# Patient Record
Sex: MALE | Race: OTHER | HISPANIC OR LATINO | URBAN - METROPOLITAN AREA
[De-identification: names, ages, dates, MRNs, and addresses within clinical notes are randomized per-mention and may not be internally consistent; named-entity substitution may affect disease eponyms.]

---

## 2021-09-28 ENCOUNTER — EMERGENCY (EMERGENCY)
Facility: HOSPITAL | Age: 36
LOS: 1 days | Discharge: HOME | End: 2021-09-28
Attending: STUDENT IN AN ORGANIZED HEALTH CARE EDUCATION/TRAINING PROGRAM | Admitting: STUDENT IN AN ORGANIZED HEALTH CARE EDUCATION/TRAINING PROGRAM
Payer: MEDICAID

## 2021-09-28 VITALS
OXYGEN SATURATION: 98 % | RESPIRATION RATE: 18 BRPM | SYSTOLIC BLOOD PRESSURE: 148 MMHG | TEMPERATURE: 98 F | HEART RATE: 78 BPM | DIASTOLIC BLOOD PRESSURE: 75 MMHG

## 2021-09-28 PROCEDURE — 99283 EMERGENCY DEPT VISIT LOW MDM: CPT

## 2021-09-28 RX ORDER — IBUPROFEN 200 MG
400 TABLET ORAL ONCE
Refills: 0 | Status: COMPLETED | OUTPATIENT
Start: 2021-09-28 | End: 2021-09-28

## 2021-09-28 RX ORDER — ACETAMINOPHEN 500 MG
650 TABLET ORAL ONCE
Refills: 0 | Status: COMPLETED | OUTPATIENT
Start: 2021-09-28 | End: 2021-09-28

## 2021-09-28 RX ADMIN — Medication 650 MILLIGRAM(S): at 10:11

## 2021-09-28 RX ADMIN — Medication 400 MILLIGRAM(S): at 10:12

## 2021-09-28 NOTE — ED PROVIDER NOTE - PATIENT PORTAL LINK FT
You can access the FollowMyHealth Patient Portal offered by [facility] by registering at the following website: [email or web address]. By joining [employer]’s FollowMyHealth portal, you will also be able to view your health information using other applications (apps) compatible with our [email or web address]

## 2021-09-28 NOTE — ED PROVIDER NOTE - NSFOLLOWUPINSTRUCTIONS_ED_ALL_ED_FT
Please follow up with a rheumatologist within 1 week.   Take ibuprofen for pain (600 mg every 6 hours).     ARTHRITIS   WHAT YOU NEED TO KNOW:  Arthritis is pain or disease in one or more joints. There are many types of arthritis. Types such as rheumatoid arthritis cause inflammation in the joints. Other types wear away the cartilage between joints, such as osteoarthritis. This makes the bones of the joint rub together when you move the joint. An infection from bacteria, a virus, or a fungus can also cause arthritis. Your symptoms may be constant, or symptoms may come and go. Arthritis often gets worse over time and can cause permanent joint damage.  DISCHARGE INSTRUCTIONS:  Call your doctor or rheumatologist if:   •You have a fever and severe joint pain or swelling.  •You cannot move the affected joint.  •You have severe joint pain you cannot tolerate.  •You have a new or worsening rash.  •Your pain or swelling does not get better with treatment.  •You have questions or concerns about your condition or care.  Medicines:   •Acetaminophen decreases pain and fever. It is available without a doctor's order. Ask how much to take and how often to take it. Follow directions. Read the labels of all other medicines you are using to see if they also contain acetaminophen, or ask your doctor or pharmacist. Acetaminophen can cause liver damage if not taken correctly. Do not use more than 4 grams (4,000 milligrams) total of acetaminophen in one day.   •NSAIDs, such as ibuprofen, help decrease swelling, pain, and fever. This medicine is available with or without a doctor's order. NSAIDs can cause stomach bleeding or kidney problems in certain people. If you take blood thinner medicine, always ask your healthcare provider if NSAIDs are safe for you. Always read the medicine label and follow directions  •Steroids reduce swelling and pain.  •Prescription pain medicine may be given. Ask your healthcare provider how to take this medicine safely. Some prescription pain medicines contain acetaminophen. Do not take other medicines that contain acetaminophen without talking to your healthcare provider. Too much acetaminophen may cause liver damage. Prescription pain medicine may cause constipation. Ask your healthcare provider how to prevent or treat constipation.   •Take your medicine as directed. Contact your healthcare provider if you think your medicine is not helping or if you have side effects. Tell him of her if you are allergic to any medicine. Keep a list of the medicines, vitamins, and herbs you take. Include the amounts, and when and why you take them. Bring the list or the pill bottles to follow-up visits. Carry your medicine list with you in case of an emergency.  MANAGE YOUR SYMPTOMS:   •Rest your painful joint so it can heal. Your healthcare provider may recommend crutches or a walker if the affected joint is in a leg.  •Apply ice or heat to the joint. Both can help decrease swelling and pain. Ice may also help prevent tissue damage. Use an ice pack, or put crushed ice in a plastic bag. Cover it with a towel and place it on your joint for 15 to 20 minutes every hour or as directed. You can apply heat for 20 minutes every 2 hours. Heat treatment includes hot packs or heat lamps.  •Elevate your joint. Elevation helps reduce swelling and pain. Raise your joint above the level of your heart as often as you can. Prop your painful joint on pillows to keep it above your heart comfortably.  Elevate Leg  MANAGE ARTHRITIS  •Talk to your healthcare providers about your arthritis medicines. Some medicines may only be needed when you have arthritis pain. You may need to take other medicines every day to prevent arthritis from getting worse. Your healthcare providers will help you understand all your medicines and when to take them. It is important to take the medicines as directed, even if you start to feel better. You can continue to have joint damage and inflammation even if you do not feel it.  •Eat a variety of healthy foods. Healthy foods include fruits, vegetables, whole-grain breads, low-fat dairy products, beans, lean meats, and fish. Ask if you need to be on a special diet. A diet rich in calcium and vitamin D may decrease your risk of osteoporosis. Foods high in calcium include milk, cheese, broccoli, and tofu. Vitamin D may be found in meat, fish, fortified milk, cereal and bread. Ask if you need calcium or vitamin D supplements.   •Go to physical or occupational therapy as directed. A physical therapist can teach you exercises to improve flexibility and range of motion. You may also be shown non-weight-bearing exercises that are safe for your joints, such as swimming. Exercise can help keep your joints flexible and reduce pain. An occupational therapist can help you learn to do your daily activities when your joints are stiff or sore.  •Maintain a healthy weight. Extra weight puts increased pressure on your joints. Ask your healthcare provider what you should weigh. If you need to lose weight, he or she can help you create a weight loss program. Weight loss can help reduce pain and increase your ability to do your activities.  •Wear flat or low-heeled shoes. This will help decrease pain and reduce pressure on your ankle, knee, and hip joints.  •Do not smoke. Nicotine and other chemicals in cigarettes and cigars can damage your bones and joints. Ask your healthcare provider for information if you currently smoke and need help to quit. E-cigarettes or smokeless tobacco still contain nicotine. Talk to your healthcare provider before you use these products.   Support devices:   •Orthotic shoes or insoles help support your feet when you walk.  •Crutches, a cane, or a walker may help decrease your risk for falling. They also decrease stress on affected joints.  •Devices to prevent falls include raised toilet seats and bathtub bars to help you get up from sitting. Handrails can be placed in areas where you need balance and support.  •Devices to help with support and rest include splints to wear on your hands and a firm pillow while you sleep. Use a pillow that is firm enough to support your neck and head.  Follow up with your healthcare provider or rheumatologist as directed: Write down your questions so you remember to ask them during your visits.  Artritis    LO QUE NECESITA SABER:  La artritis es dolor o enfermedad en tiffany o más articulaciones. Existen muchos tipos de artritis. Los tipos zita la artritis reumatoide provocan inflamación en las articulaciones. Otros tipos desgastan el cartílago entre las articulaciones. [place] hace que los huesos de las articulaciones se rocen entre sí cuando usted mueve la articulación. Tiffany infección por bacterias, un virus o un hongo también puede causar artritis. Galina síntomas pueden ser constantes o pueden ser intermitentes. La artritis por lo general empeora con el paso del tiempo y puede provocar daños permanentes en la articulación.  INSTRUCCIONES SOBRE EL TRISTEN HOSPITALARIA:  Llame a diehl médico o reumatólogo si:  Usted tiene fiebre y un eren dolor o inflamación en la articulación.  Usted no puede  la articulación afectada.  Usted tiene dolor articular intenso que no puede tolerar.  Usted tiene un sarpullido nuevo o que empeora.  Diehl dolor o inflamación no mejoran con el tratamiento.  Usted tiene preguntas o inquietudes acerca de diehl condición o cuidado.  Medicamentos:  Acetaminofénalivia el dolor y baja la fiebre. Está disponible sin receta médica. Pregunte la cantidad y la frecuencia con que debe tomarlos. Siga las indicaciones. Karime las etiquetas de todos los demás medicamentos que esté usando para saber si también contienen acetaminofén, o pregunte a diehl médico o farmacéutico. El acetaminofén puede causar daño en el hígado cuando no se areli de forma correcta. No use más de 4 gramos (4000 miligramos) en total de acetaminofeno en un día.  Los REESE,zita el ibuprofeno, ayudan a disminuir la inflamación, el dolor y la fiebre. Alondra medicamento está disponible con o sin tiffany receta médica. Los REESE pueden causar sangrado estomacal o problemas renales en ciertas personas. Si usted areli un medicamento anticoagulante, siempre pregúntele a diehl médico si los REESE son seguros para usted. Siempre karime la etiqueta de alondra medicamento y siga las instrucciones.  Esteroidesreducen la inflamación y el dolor.  Puede administrarsepodrían administrarse. Pregunte al médico cómo debe rodney alondra medicamento de forma rajan. Algunos medicamentos recetados para el dolor contienen acetaminofén. No tome otros medicamentos que contengan acetaminofén sin consultarlo con diehl médico. Demasiado acetaminofeno puede causar daño al hígado. Los medicamentos recetados para el dolor podrían causar estreñimiento. Pregunte a diehl médico zita prevenir o tratar estreñimiento.  [place] galina medicamentos zita se le haya indicado.Consulte con diehl médico si usted pawan que diehl medicamento no le está ayudando o si presenta efectos secundarios. Infórmele si es alérgico a algún medicamento. Mantenga tiffany lista actualizada de los medicamentos, las vitaminas y los productos herbales que areli. Incluya los siguientes datos de los medicamentos: cantidad, frecuencia y motivo de administración. Traiga con usted la lista o los envases de las píldoras a galina citas de seguimiento. Lleve la lista de los medicamentos con usted en foster de tiffany emergencia.  El manejo de galina síntomas:  Descanse diehl articulación adolorida para que pueda recuperarse.Diehl médico podría recomendarle que use muletas o un caminador si la articulación afectada está en tiffany pierna.  Aplique hielo o calor a diehl articulación.Aplique hielo a diehl articulación. El hielo también puede contribuir a evitar el daño de los tejidos. Use tiffany compresa de hielo o ponga hielo triturado en tiffany bolsa de plástico. Cúbrala con tiffany toalla y póngasela en la articulación adolorida de 15 a 20 minutos cada hora o según las indicaciones. Usted puede aplicarse calor geoffrey 20 minutos cada 2 horas. Para el tratamiento con calor puede usar compresas calientes o tiffany lámpara de calor.  Eleve diehl articulación.[place] le ayudará a disminuir la inflamación y el dolor. Eleve diehl articulación por encima del nivel del corazón con la frecuencia que pueda. Apoye diehl articulación adolorida sobre almohadas para mantenerla cómodamente por encima del nivel del corazón.  Elevar la pierna  Controle la artritis:  Informe a galina médicos sobre los medicamentos que areli para la artritis.Es posible que deba rodney algunos medicamentos solamente si usted siente dolor vinculado con la artritis. Es posible que deba rodney otros medicamentos todos los días para evitar que la artritis empeore. Galina médicos le ayudarán a entender todos galina medicamentos y cuándo tomarlos. Es importante que tome los medicamentos zita se le indica, incluso si comienza a sentirse mejor. Usted puede continuar teniendo daño e inflamación articular, incluso si no lo siente.  Consuma alimentos saludables y variados.Los alimentos saludables incluyen frutas, verduras, pan integral, productos lácteos bajos en grasa, frijoles, amanda magras y pescado. Pregunte si necesita seguir tiffany dieta especial. Es posible que tiffany dieta tristen en calcio y vitamina D disminuya diehl riesgo de tener osteoporosis. Los alimentos altos en calcio incluyen leche, queso, brócoli y tofu. La vitamina D puede encontrarse en la carne, el pescado, la leche fortificada, los cereales y el pan. Consulte si debe rodney suplementos de calcio o de vitamina D.  Asista a terapia física u ocupacional zita se le indique.Un fisioterapeuta le puede enseñar ejercicios para mejorar la flexibilidad y el rango de movimiento. También le pueden mostrar ejercicios que no implican soporte de peso y que son adecuados para las articulaciones zita la natación. El ejercicio puede ayudarle con la flexibilidad de las articulaciones y a reducir el dolor. Un terapeuta ocupacional puede ayudarle para que aprenda a hacer galina actividades cotidianas cuando galina articulaciones estén rígidas o adoloridas.  Mantenga un peso saludable.El exceso de peso ejerce presión a galina articulaciones. Pregunte a diehl proveedor cuál debería de ser diehl peso. Si necesita perder peso, él puede ayudarle a crear un programa para bajar de peso. La pérdida de peso le puede ayudar a reducir el dolor y aumentar diehl habilidad para llevar a cabo galina actividades.  Use zapatos sin tacones o de tacones bajos.[place] le servirá para aliviar el dolor y a disminuir la presión que se ejerce en las articulaciones de diehl tobillo, rodilla y caderas.  No fume.La nicotina y otros químicos contenidos en los cigarrillos y cigarros pueden dañar los huesos y las articulaciones. Pida información a diehl médico si usted actualmente fuma y necesita ayuda para dejar de fumar. Los cigarrillos electrónicos o el tabaco sin humo igualmente contienen nicotina. Consulte con diehl médico antes de utilizar estos productos.  Dispositivos de apoyo:  Los zapatos o plantillas ortopédicossirven para proporcionar soporte a diehl pies cuando camina.  Unas muletas, un bastón o un caminadorpodrían ayudar a reducir el riesgo de tiffany caída. También disminuyen la presión sobre las articulaciones afectadas.  Los dispositivos para evitar las caídasincluyen los asientos elevados para el inodoro y las barras de apoyo de la ulisses del baño para ayudarlo a levantarse cuando está sentado. Se pueden colocar barandas en las áreas donde usted necesite equilibrio y apoyo.  Los dispositivos para ayudar con el apoyo y el descansoincluyen férulas para usar en las megan y tiffany almohada firme mientras duerme. Use tiffany almohada lo suficientemente firme para que le proporcione soporte al eveline y a la ghulam.  Programe tiffany elfego con diehl médico o reumatólogo zita se le indique:[place] galina preguntas para que se acuerde de hacerlas geoffrey galina visitas. Please follow up with a rheumatologist within 1 week.   Take ibuprofen for pain (600 mg every 6 hours). Tylenol 650 mg every 5    [facility]  Rheumatology  [address], [address]  [place] [address]  Phone: [phone]  Follow Up Time: 4-6 Days      ARTHRITIS   WHAT YOU NEED TO KNOW:  Arthritis is pain or disease in one or more joints. There are many types of arthritis. Types such as rheumatoid arthritis cause inflammation in the joints. Other types wear away the cartilage between joints, such as osteoarthritis. This makes the bones of the joint rub together when you move the joint. An infection from bacteria, a virus, or a fungus can also cause arthritis. Your symptoms may be constant, or symptoms may come and go. Arthritis often gets worse over time and can cause permanent joint damage.  DISCHARGE INSTRUCTIONS:  Call your doctor or rheumatologist if:   •You have a fever and severe joint pain or swelling.  •You cannot move the affected joint.  •You have severe joint pain you cannot tolerate.  •You have a new or worsening rash.  •Your pain or swelling does not get better with treatment.  •You have questions or concerns about your condition or care.  Medicines:   •Acetaminophen decreases pain and fever. It is available without a doctor's order. Ask how much to take and how often to take it. Follow directions. Read the labels of all other medicines you are using to see if they also contain acetaminophen, or ask your doctor or pharmacist. Acetaminophen can cause liver damage if not taken correctly. Do not use more than 4 grams (4,000 milligrams) total of acetaminophen in one day.   •NSAIDs, such as ibuprofen, help decrease swelling, pain, and fever. This medicine is available with or without a doctor's order. NSAIDs can cause stomach bleeding or kidney problems in certain people. If you take blood thinner medicine, always ask your healthcare provider if NSAIDs are safe for you. Always read the medicine label and follow directions  •Steroids reduce swelling and pain.  •Prescription pain medicine may be given. Ask your healthcare provider how to take this medicine safely. Some prescription pain medicines contain acetaminophen. Do not take other medicines that contain acetaminophen without talking to your healthcare provider. Too much acetaminophen may cause liver damage. Prescription pain medicine may cause constipation. Ask your healthcare provider how to prevent or treat constipation.   •Take your medicine as directed. Contact your healthcare provider if you think your medicine is not helping or if you have side effects. Tell him of her if you are allergic to any medicine. Keep a list of the medicines, vitamins, and herbs you take. Include the amounts, and when and why you take them. Bring the list or the pill bottles to follow-up visits. Carry your medicine list with you in case of an emergency.  MANAGE YOUR SYMPTOMS:   •Rest your painful joint so it can heal. Your healthcare provider may recommend crutches or a walker if the affected joint is in a leg.  •Apply ice or heat to the joint. Both can help decrease swelling and pain. Ice may also help prevent tissue damage. Use an ice pack, or put crushed ice in a plastic bag. Cover it with a towel and place it on your joint for 15 to 20 minutes every hour or as directed. You can apply heat for 20 minutes every 2 hours. Heat treatment includes hot packs or heat lamps.  •Elevate your joint. Elevation helps reduce swelling and pain. Raise your joint above the level of your heart as often as you can. Prop your painful joint on pillows to keep it above your heart comfortably.  Elevate Leg  MANAGE ARTHRITIS  •Talk to your healthcare providers about your arthritis medicines. Some medicines may only be needed when you have arthritis pain. You may need to take other medicines every day to prevent arthritis from getting worse. Your healthcare providers will help you understand all your medicines and when to take them. It is important to take the medicines as directed, even if you start to feel better. You can continue to have joint damage and inflammation even if you do not feel it.  •Eat a variety of healthy foods. Healthy foods include fruits, vegetables, whole-grain breads, low-fat dairy products, beans, lean meats, and fish. Ask if you need to be on a special diet. A diet rich in calcium and vitamin D may decrease your risk of osteoporosis. Foods high in calcium include milk, cheese, broccoli, and tofu. Vitamin D may be found in meat, fish, fortified milk, cereal and bread. Ask if you need calcium or vitamin D supplements.   •Go to physical or occupational therapy as directed. A physical therapist can teach you exercises to improve flexibility and range of motion. You may also be shown non-weight-bearing exercises that are safe for your joints, such as swimming. Exercise can help keep your joints flexible and reduce pain. An occupational therapist can help you learn to do your daily activities when your joints are stiff or sore.  •Maintain a healthy weight. Extra weight puts increased pressure on your joints. Ask your healthcare provider what you should weigh. If you need to lose weight, he or she can help you create a weight loss program. Weight loss can help reduce pain and increase your ability to do your activities.  •Wear flat or low-heeled shoes. This will help decrease pain and reduce pressure on your ankle, knee, and hip joints.  •Do not smoke. Nicotine and other chemicals in cigarettes and cigars can damage your bones and joints. Ask your healthcare provider for information if you currently smoke and need help to quit. E-cigarettes or smokeless tobacco still contain nicotine. Talk to your healthcare provider before you use these products.   Support devices:   •Orthotic shoes or insoles help support your feet when you walk.  •Crutches, a cane, or a walker may help decrease your risk for falling. They also decrease stress on affected joints.  •Devices to prevent falls include raised toilet seats and bathtub bars to help you get up from sitting. Handrails can be placed in areas where you need balance and support.  •Devices to help with support and rest include splints to wear on your hands and a firm pillow while you sleep. Use a pillow that is firm enough to support your neck and head.  Follow up with your healthcare provider or rheumatologist as directed: Write down your questions so you remember to ask them during your visits.  Artritis    LO QUE NECESITA SABER:  La artritis es dolor o enfermedad en tiffany o más articulaciones. Existen muchos tipos de artritis. Los tipos zita la artritis reumatoide provocan inflamación en las articulaciones. Otros tipos desgastan el cartílago entre las articulaciones. [place] hace que los huesos de las articulaciones se rocen entre sí cuando usted mueve la articulación. Tiffany infección por bacterias, un virus o un hongo también puede causar artritis. Galina síntomas pueden ser constantes o pueden ser intermitentes. La artritis por lo general empeora con el paso del tiempo y puede provocar daños permanentes en la articulación.  INSTRUCCIONES SOBRE EL TRISTEN HOSPITALARIA:  Llame a diehl médico o reumatólogo si:  Usted tiene fiebre y un eren dolor o inflamación en la articulación.  Usted no puede  la articulación afectada.  Usted tiene dolor articular intenso que no puede tolerar.  Usted tiene un sarpullido nuevo o que empeora.  Diehl dolor o inflamación no mejoran con el tratamiento.  Usted tiene preguntas o inquietudes acerca de diehl condición o cuidado.  Medicamentos:  Acetaminofénalivia el dolor y baja la fiebre. Está disponible sin receta médica. Pregunte la cantidad y la frecuencia con que debe tomarlos. Siga las indicaciones. Karime las etiquetas de todos los demás medicamentos que esté usando para saber si también contienen acetaminofén, o pregunte a diehl médico o farmacéutico. El acetaminofén puede causar daño en el hígado cuando no se areli de forma correcta. No use más de 4 gramos (4000 miligramos) en total de acetaminofeno en un día.  Los REESE,zita el ibuprofeno, ayudan a disminuir la inflamación, el dolor y la fiebre. Alondra medicamento está disponible con o sin tiffany receta médica. Los REESE pueden causar sangrado estomacal o problemas renales en ciertas personas. Si usted areli un medicamento anticoagulante, siempre pregúntele a diehl médico si los REESE son seguros para usted. Siempre karime la etiqueta de alondra medicamento y siga las instrucciones.  Esteroidesreducen la inflamación y el dolor.  Puede administrarsepodrían administrarse. Pregunte al médico cómo debe rodney alondra medicamento de forma rajan. Algunos medicamentos recetados para el dolor contienen acetaminofén. No tome otros medicamentos que contengan acetaminofén sin consultarlo con diehl médico. Demasiado acetaminofeno puede causar daño al hígado. Los medicamentos recetados para el dolor podrían causar estreñimiento. Pregunte a diehl médico zita prevenir o tratar estreñimiento.  [place] galina medicamentos zita se le haya indicado.Consulte con diehl médico si usted pawan que diehl medicamento no le está ayudando o si presenta efectos secundarios. Infórmele si es alérgico a algún medicamento. Mantenga tiffany lista actualizada de los medicamentos, las vitaminas y los productos herbales que areli. Incluya los siguientes datos de los medicamentos: cantidad, frecuencia y motivo de administración. Traiga con usted la lista o los envases de las píldoras a galina citas de seguimiento. Lleve la lista de los medicamentos con usted en foster de tiffany emergencia.  El manejo de galina síntomas:  Descanse diehl articulación adolorida para que pueda recuperarse.Diehl médico podría recomendarle que use muletas o un caminador si la articulación afectada está en tiffany pierna.  Aplique hielo o calor a diehl articulación.Aplique hielo a diehl articulación. El hielo también puede contribuir a evitar el daño de los tejidos. Use tiffany compresa de hielo o ponga hielo triturado en tiffany bolsa de plástico. Cúbrala con tiffany toalla y póngasela en la articulación adolorida de 15 a 20 minutos cada hora o según las indicaciones. Usted puede aplicarse calor geoffrey 20 minutos cada 2 horas. Para el tratamiento con calor puede usar compresas calientes o tiffany lámpara de calor.  Eleve diehl articulación.[place] le ayudará a disminuir la inflamación y el dolor. Eleve diehl articulación por encima del nivel del corazón con la frecuencia que pueda. Apoye diehl articulación adolorida sobre almohadas para mantenerla cómodamente por encima del nivel del corazón.  Elevar la pierna  Controle la artritis:  Informe a galina médicos sobre los medicamentos que areli para la artritis.Es posible que deba rodney algunos medicamentos solamente si usted siente dolor vinculado con la artritis. Es posible que deba rodney otros medicamentos todos los días para evitar que la artritis empeore. Galina médicos le ayudarán a entender todos galina medicamentos y cuándo tomarlos. Es importante que tome los medicamentos zita se le indica, incluso si comienza a sentirse mejor. Usted puede continuar teniendo daño e inflamación articular, incluso si no lo siente.  Consuma alimentos saludables y variados.Los alimentos saludables incluyen frutas, verduras, pan integral, productos lácteos bajos en grasa, frijoles, amanda magras y pescado. Pregunte si necesita seguir tiffany dieta especial. Es posible que tiffany dieta tristen en calcio y vitamina D disminuya diehl riesgo de tener osteoporosis. Los alimentos altos en calcio incluyen leche, queso, brócoli y tofu. La vitamina D puede encontrarse en la carne, el pescado, la leche fortificada, los cereales y el pan. Consulte si debe rodney suplementos de calcio o de vitamina D.  Asista a terapia física u ocupacional zita se le indique.Un fisioterapeuta le puede enseñar ejercicios para mejorar la flexibilidad y el rango de movimiento. También le pueden mostrar ejercicios que no implican soporte de peso y que son adecuados para las articulaciones zita la natación. El ejercicio puede ayudarle con la flexibilidad de las articulaciones y a reducir el dolor. Un terapeuta ocupacional puede ayudarle para que aprenda a hacer galina actividades cotidianas cuando galina articulaciones estén rígidas o adoloridas.  Mantenga un peso saludable.El exceso de peso ejerce presión a galina articulaciones. Pregunte a diehl proveedor cuál debería de ser diehl peso. Si necesita perder peso, él puede ayudarle a crear un programa para bajar de peso. La pérdida de peso le puede ayudar a reducir el dolor y aumentar diehl habilidad para llevar a cabo galina actividades.  Use zapatos sin tacones o de tacones bajos.[place] le servirá para aliviar el dolor y a disminuir la presión que se ejerce en las articulaciones de diehl tobillo, rodilla y caderas.  No fume.La nicotina y otros químicos contenidos en los cigarrillos y cigarros pueden dañar los huesos y las articulaciones. Pida información a diehl médico si usted actualmente fuma y necesita ayuda para dejar de fumar. Los cigarrillos electrónicos o el tabaco sin humo igualmente contienen nicotina. Consulte con diehl médico antes de utilizar estos productos.  Dispositivos de apoyo:  Los zapatos o plantillas ortopédicossirven para proporcionar soporte a diehl pies cuando camina.  Unas muletas, un bastón o un caminadorpodrían ayudar a reducir el riesgo de tiffany caída. También disminuyen la presión sobre las articulaciones afectadas.  Los dispositivos para evitar las caídasincluyen los asientos elevados para el inodoro y las barras de apoyo de la ulisses del baño para ayudarlo a levantarse cuando está sentado. Se pueden colocar barandas en las áreas donde usted necesite equilibrio y apoyo.  Los dispositivos para ayudar con el apoyo y el descansoincluyen férulas para usar en las megan y tiffany almohada firme mientras duerme. Use tiffany almohada lo suficientemente firme para que le proporcione soporte al eveline y a la ghulam.  Programe tiffany elfego con diehl médico o reumatólogo zita se le indique:Anote galina preguntas para que se acuerde de hacerlas geoffrey galina visitas.

## 2021-09-28 NOTE — ED ADULT TRIAGE NOTE - CHIEF COMPLAINT QUOTE
p/t c/o of pain to joints and bilateral hands for few weeks on and off denies any trauma, good ROM noted

## 2021-09-28 NOTE — ED PROVIDER NOTE - NSFOLLOWUPCLINICS_GEN_ALL_ED_FT
[facility] Rheumatology  Rheumatology  [address] [address]  Phone: [phone]  Fax:   Follow Up Time: 4-6 Days

## 2021-09-28 NOTE — ED PROVIDER NOTE - OBJECTIVE STATEMENT
36 year old male with PMH gout presents with joint pains. Reports 4 weeks of pain in PIPs of B/L 2nd-5th digits and right 1st toe. Hand pain began 3 weeks ago and has been consistent. Patient's toe pain began 3 days ago. Patient reports joint pains are worse upon waking and improve throughout the day. He has taken ibuprofen for symptoms with some relief. Denies fever, chills, blurry vision, chest pain, shortness of breath, abdominal pain.

## 2021-09-28 NOTE — ED PROVIDER NOTE - NS ED ROS FT
ROS:  -Constitutional: Denies fever  -Head: Denies headache  -Eyes: Denies blurry vision  -Cardiovascular: Denies chest pain  -Pulmonary: Denies shortness of breath  -Gastrointestinal: Denies nausea or diarrhea  -Genitourinary: Denies dysuria  -Skin: Denies new rashes  -Neuro: Denies numbness or tingling or weakness

## 2021-09-28 NOTE — ED PROVIDER NOTE - ATTENDING CONTRIBUTION TO CARE
I (José Antonio) agree with above, I performed a history and physical. Counseled charlette medical staff, physician assistant, and/or medical student on medical decision making as documented. Medical decisions and treatment interventions were made in real time during the patient encounter. Additionally and/or with the following exceptions: the patient presented with chronic multi joint pain which is worse when waking up and better later in the night, responds to nsaids. does have a history of gout in right toe. ||| vital signs normal and stable during my period of care ||| minimal joint effusion right big toe, no erythema, no pain on joint movement ||| will give rheum follow up for probable autoimmune arthritis  counseled on nsaid/apap use, return precautions reviewed.

## 2021-09-28 NOTE — ED PROVIDER NOTE - PHYSICAL EXAMINATION
PHYSICAL EXAM:  CONSTITUTIONAL: Well appearing, awake, alert, oriented to person, place, time/situation and in no apparent distress.  HEAD: Atraumatic  EYES: Clear bilaterally, pupils equal, round and reactive to light.  CARDIAC: Normal rate, regular rhythm. +S1/S2. No murmurs, rubs or gallops.  RESPIRATORY: Breathing unlabored.  NEUROLOGICAL: Alert and oriented, no focal deficits, no motor or sensory deficits.  MSK:  - Tenderness over PIPs of 2nd-4th digits B/L, minimal effusion.  - Tenderness and minimal effusion  SKIN: Skin warm and dry. No evidence of rashes or lesions. PHYSICAL EXAM:  CONSTITUTIONAL: Well appearing, awake, alert, oriented to person, place, time/situation and in no apparent distress.  HEAD: Atraumatic  EYES: Clear bilaterally, pupils equal, round and reactive to light.  CARDIAC: Normal rate, regular rhythm. +S1/S2. No murmurs, rubs or gallops.  RESPIRATORY: Breathing unlabored.  NEUROLOGICAL: Alert and oriented, no focal deficits, no motor or sensory deficits.  MSK:  - Tenderness over PIPs of 2nd-4th digits B/L, minimal effusion. Sensation intact, normal capillary refill. Strength 5/5 at each joint.   - Tenderness and minimal effusion of right 1st toe MTP. Sensation intact, normal capillary refill. Strength 5/5.  SKIN: Skin warm and dry.

## 2021-09-28 NOTE — ED PROVIDER NOTE - CLINICAL SUMMARY MEDICAL DECISION MAKING FREE TEXT BOX
36 year old male with PMH gout presents with joint pains. Reports 4 weeks of pain in PIPs of B/L 2nd-5th digits and right 1st toe. Hand pain began 3 weeks ago and has been consistent. Patient's toe pain began 3 days ago. Patient reports joint pains are worse upon waking and improve throughout the day. He has taken ibuprofen for symptoms with some relief. Denies fever, chills, blurry vision. Pain likely arthritis vs. gout flare. Will provide rheumatology consult for autoimmune arthritis work up, medications for pain, and discharge.

## 2022-11-04 ENCOUNTER — EMERGENCY (EMERGENCY)
Facility: HOSPITAL | Age: 37
LOS: 1 days | Discharge: HOME | End: 2022-11-04
Attending: EMERGENCY MEDICINE | Admitting: EMERGENCY MEDICINE

## 2022-11-04 VITALS
TEMPERATURE: 98 F | SYSTOLIC BLOOD PRESSURE: 138 MMHG | RESPIRATION RATE: 18 BRPM | DIASTOLIC BLOOD PRESSURE: 92 MMHG | OXYGEN SATURATION: 100 % | HEART RATE: 80 BPM

## 2022-11-04 VITALS
DIASTOLIC BLOOD PRESSURE: 86 MMHG | SYSTOLIC BLOOD PRESSURE: 135 MMHG | HEART RATE: 77 BPM | TEMPERATURE: 98 F | OXYGEN SATURATION: 98 % | RESPIRATION RATE: 18 BRPM

## 2022-11-04 PROCEDURE — 99283 EMERGENCY DEPT VISIT LOW MDM: CPT

## 2022-11-04 PROCEDURE — 73080 X-RAY EXAM OF ELBOW: CPT | Mod: 26,50

## 2022-11-04 RX ORDER — ACETAMINOPHEN 500 MG
975 TABLET ORAL ONCE
Refills: 0 | Status: COMPLETED | OUTPATIENT
Start: 2022-11-04 | End: 2022-11-04

## 2022-11-04 RX ADMIN — Medication 975 MILLIGRAM(S): at 05:20

## 2022-11-04 NOTE — ED PROVIDER NOTE - OBJECTIVE STATEMENT
38 y/o M with h/o gout, arthritis here with bilateral elbow pain and swelling.  Pt reports several weeks of pain and swelling to elbows - initially L>R, but left elbow now improving and R side is now more swollen.  No trauma, fever, other joint pains or swelling.  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  Took ibuprofen 2 hours PTA with some relief in pain.

## 2022-11-04 NOTE — ED PROVIDER NOTE - CLINICAL SUMMARY MEDICAL DECISION MAKING FREE TEXT BOX
38 y/o M with bilateral elbow pain and swelling x weeks, R>L.  Exam consistent with bursitis; low saw trauma will obtain xr r/o fx dislocation, do not suspect septic bursitis or arthritis.  NSAIDs, RICE, XR >>dc

## 2022-11-04 NOTE — ED PROVIDER NOTE - NSICDXPASTMEDICALHX_GEN_ALL_CORE_FT
PAST MEDICAL HISTORY:  No pertinent past medical history      PAST MEDICAL HISTORY:  Gout     Rheumatoid arthritis

## 2022-11-04 NOTE — ED PROVIDER NOTE - PATIENT PORTAL LINK FT
You can access the FollowMyHealth Patient Portal offered by [facility] by registering at the following website: [email or web address]. By joining [facility]’s FollowMyHealth portal, you will also be able to view your health information using other applications (apps) compatible with our system.

## 2022-11-04 NOTE — ED PROVIDER NOTE - NSFOLLOWUPINSTRUCTIONS_ED_ALL_ED_FT
You were seen in the Emergency Department for swelling of your bilateral elbows. You received xrays, which did not show a fracture or dislocation. It is likely that your swelling is due to bursitis. You can rest, ice, compress your elbows for improvement. You can take 600 mg ibuprofen every 8 hours for pain control. If the ibuprofen is not controlling your pain. you can take Ibuprofen 400 mg along with Tylenol 650mg-1000mg every 6 to 8 hours. Limit your maximum daily Tylenol from all sources to 4000mg. Be aware that many other medications contain acetaminophen which is also known as Tylenol. Taking Tylenol and Ibuprofen together has been shown to be more effective at relieving pain than taking them separately. These are both over the counter medications that you can  at your local pharmacy without a prescription. You need to respect all of the warnings on the bottles. You shouldn’t take these medications for more than a week without following up with your doctor. Both medications come with certain risks and side effects that you need to discuss with your doctor, especially if you are taking them for a prolonged period.     1) Advance activity as tolerated.   2) Continue all previously prescribed medications as directed.    3) Follow up with your rheumatologist - take copies of your results.    4) Return to the Emergency Department for worsening or persistent symptoms, and/or ANY NEW OR CONCERNING SYMPTOMS.

## 2022-11-04 NOTE — ED ADULT NURSE NOTE - OBJECTIVE STATEMENT
pt a&ox4 ambulatory c/o bilateral elbow swelling. pt elbows feel warm to touch (&fluid filled). pt elbows appear mildly swollen. pt pmhx gout. pt denies chest pain, sob, nausea, vomiting, dizziness, fevers/chills. pt states took "ibuprofen at home with no relief". pt respirations even and unlabored with no accessory muscle use. pt pending MD veras. pt sitting in stretcher in spot 25a in NAD. safety maintained. pt a&ox4 ambulatory c/o bilateral elbow swelling over the last 3 days. pt elbows feel warm to touch (&fluid filled). pt elbows appear mildly swollen. pt pmhx gout. pt denies chest pain, sob, nausea, vomiting, dizziness, fevers/chills. pt states took "ibuprofen at home with no relief". pt respirations even and unlabored with no accessory muscle use. pt pending MD veras. pt sitting in stretcher in spot 25a in NAD. safety maintained.

## 2023-01-03 ENCOUNTER — EMERGENCY (EMERGENCY)
Facility: HOSPITAL | Age: 38
LOS: 1 days | Discharge: HOME | End: 2023-01-03
Attending: EMERGENCY MEDICINE | Admitting: EMERGENCY MEDICINE
Payer: MEDICAID

## 2023-01-03 VITALS
HEART RATE: 86 BPM | SYSTOLIC BLOOD PRESSURE: 143 MMHG | TEMPERATURE: 98 F | RESPIRATION RATE: 18 BRPM | OXYGEN SATURATION: 100 % | DIASTOLIC BLOOD PRESSURE: 100 MMHG

## 2023-01-03 PROBLEM — M06.9 RHEUMATOID ARTHRITIS, UNSPECIFIED: Chronic | Status: ACTIVE | Noted: 2022-11-04

## 2023-01-03 PROBLEM — M10.9 GOUT, UNSPECIFIED: Chronic | Status: ACTIVE | Noted: 2022-11-04

## 2023-01-03 LAB
ALBUMIN SERPL ELPH-MCNC: 4.4 G/DL — SIGNIFICANT CHANGE UP (ref 3.3–5)
ALP SERPL-CCNC: 107 U/L — SIGNIFICANT CHANGE UP (ref 40–120)
ALT FLD-CCNC: 242 U/L — HIGH (ref 4–41)
ANION GAP SERPL CALC-SCNC: 12 MMOL/L — SIGNIFICANT CHANGE UP (ref 7–14)
AST SERPL-CCNC: 98 U/L — HIGH (ref 4–40)
BASOPHILS # BLD AUTO: 0.05 K/UL — SIGNIFICANT CHANGE UP (ref 0–0.2)
BASOPHILS NFR BLD AUTO: 0.5 % — SIGNIFICANT CHANGE UP (ref 0–2)
BILIRUB SERPL-MCNC: 0.8 MG/DL — SIGNIFICANT CHANGE UP (ref 0.2–1.2)
BUN SERPL-MCNC: 12 MG/DL — SIGNIFICANT CHANGE UP (ref 7–23)
CALCIUM SERPL-MCNC: 9.3 MG/DL — SIGNIFICANT CHANGE UP (ref 8.4–10.5)
CHLORIDE SERPL-SCNC: 100 MMOL/L — SIGNIFICANT CHANGE UP (ref 98–107)
CO2 SERPL-SCNC: 24 MMOL/L — SIGNIFICANT CHANGE UP (ref 22–31)
CREAT SERPL-MCNC: 0.98 MG/DL — SIGNIFICANT CHANGE UP (ref 0.5–1.3)
EGFR: 101 ML/MIN/1.73M2 — SIGNIFICANT CHANGE UP
EOSINOPHIL # BLD AUTO: 0.33 K/UL — SIGNIFICANT CHANGE UP (ref 0–0.5)
EOSINOPHIL NFR BLD AUTO: 3.6 % — SIGNIFICANT CHANGE UP (ref 0–6)
FLUAV AG NPH QL: SIGNIFICANT CHANGE UP
FLUBV AG NPH QL: SIGNIFICANT CHANGE UP
GLUCOSE SERPL-MCNC: 101 MG/DL — HIGH (ref 70–99)
HCT VFR BLD CALC: 46.3 % — SIGNIFICANT CHANGE UP (ref 39–50)
HGB BLD-MCNC: 15.3 G/DL — SIGNIFICANT CHANGE UP (ref 13–17)
IANC: 4.97 K/UL — SIGNIFICANT CHANGE UP (ref 1.8–7.4)
IMM GRANULOCYTES NFR BLD AUTO: 0.4 % — SIGNIFICANT CHANGE UP (ref 0–0.9)
LYMPHOCYTES # BLD AUTO: 3.05 K/UL — SIGNIFICANT CHANGE UP (ref 1–3.3)
LYMPHOCYTES # BLD AUTO: 33.1 % — SIGNIFICANT CHANGE UP (ref 13–44)
MCHC RBC-ENTMCNC: 29.2 PG — SIGNIFICANT CHANGE UP (ref 27–34)
MCHC RBC-ENTMCNC: 33 GM/DL — SIGNIFICANT CHANGE UP (ref 32–36)
MCV RBC AUTO: 88.4 FL — SIGNIFICANT CHANGE UP (ref 80–100)
MONOCYTES # BLD AUTO: 0.78 K/UL — SIGNIFICANT CHANGE UP (ref 0–0.9)
MONOCYTES NFR BLD AUTO: 8.5 % — SIGNIFICANT CHANGE UP (ref 2–14)
NEUTROPHILS # BLD AUTO: 4.97 K/UL — SIGNIFICANT CHANGE UP (ref 1.8–7.4)
NEUTROPHILS NFR BLD AUTO: 53.9 % — SIGNIFICANT CHANGE UP (ref 43–77)
NRBC # BLD: 0 /100 WBCS — SIGNIFICANT CHANGE UP (ref 0–0)
NRBC # FLD: 0 K/UL — SIGNIFICANT CHANGE UP (ref 0–0)
PLATELET # BLD AUTO: 271 K/UL — SIGNIFICANT CHANGE UP (ref 150–400)
POTASSIUM SERPL-MCNC: 3.9 MMOL/L — SIGNIFICANT CHANGE UP (ref 3.5–5.3)
POTASSIUM SERPL-SCNC: 3.9 MMOL/L — SIGNIFICANT CHANGE UP (ref 3.5–5.3)
PROT SERPL-MCNC: 8 G/DL — SIGNIFICANT CHANGE UP (ref 6–8.3)
RBC # BLD: 5.24 M/UL — SIGNIFICANT CHANGE UP (ref 4.2–5.8)
RBC # FLD: 12.6 % — SIGNIFICANT CHANGE UP (ref 10.3–14.5)
RSV RNA NPH QL NAA+NON-PROBE: SIGNIFICANT CHANGE UP
SARS-COV-2 RNA SPEC QL NAA+PROBE: SIGNIFICANT CHANGE UP
SODIUM SERPL-SCNC: 136 MMOL/L — SIGNIFICANT CHANGE UP (ref 135–145)
TROPONIN T, HIGH SENSITIVITY RESULT: <6 NG/L — SIGNIFICANT CHANGE UP
WBC # BLD: 9.22 K/UL — SIGNIFICANT CHANGE UP (ref 3.8–10.5)
WBC # FLD AUTO: 9.22 K/UL — SIGNIFICANT CHANGE UP (ref 3.8–10.5)

## 2023-01-03 PROCEDURE — 99285 EMERGENCY DEPT VISIT HI MDM: CPT

## 2023-01-03 PROCEDURE — 71046 X-RAY EXAM CHEST 2 VIEWS: CPT | Mod: 26

## 2023-01-03 RX ORDER — ACETAMINOPHEN 500 MG
650 TABLET ORAL ONCE
Refills: 0 | Status: COMPLETED | OUTPATIENT
Start: 2023-01-03 | End: 2023-01-03

## 2023-01-03 RX ADMIN — Medication 650 MILLIGRAM(S): at 07:32

## 2023-01-03 NOTE — ED PROVIDER NOTE - PROGRESS NOTE DETAILS
[id number]- patient awaiting covid results at ID- [id number]. Labs and cxr all wnl. Will fu with pmd for lft's.

## 2023-01-03 NOTE — ED ADULT NURSE NOTE - NSIMPLEMENTINTERV_GEN_ALL_ED
Implemented All Fall Risk Interventions:  [place] to call system. Call bell, personal items and telephone within reach. Instruct patient to call for assistance. Room bathroom lighting operational. Non-slip footwear when patient is off stretcher. Physically safe environment: no spills, clutter or unnecessary equipment. Stretcher in lowest position, wheels locked, appropriate side rails in place. Provide visual cue, wrist band, yellow gown, etc. Monitor gait and stability. Monitor for mental status changes and reorient to person, place, and time. Review medications for side effects contributing to fall risk. Reinforce activity limits and safety measures with patient and family.

## 2023-01-03 NOTE — ED PROVIDER NOTE - OBJECTIVE STATEMENT
37M presenting with left sided chest pain since 1/2 at apprx 4am. Pt states that pain woke him from sleep, is burning in character and radiates to the left arm and back. Patient also notes associated headache and dizziness which has been constant since onset of chest pain. Patient denies any abd pain, nausea/v/d, urinary sx, fever/cough/sore throat.

## 2023-01-03 NOTE — ED PROVIDER NOTE - PATIENT PORTAL LINK FT
You can access the FollowMyHealth Patient Portal offered by [facility] by registering at the following website: [email or web address]. By joining [facility]’s FollowMyHealth portal, you will also be able to view your health information using other applications (apps) compatible with our system.

## 2023-01-03 NOTE — ED PROVIDER NOTE - NSPTACCESSSVCSAPPTDETAILS_ED_ALL_ED_FT
elevated liver enzymes on lab work elevated liver enzymes on lab work    Also needs cardiology eval for chest pain  within 1 week please

## 2023-01-03 NOTE — ED PROVIDER NOTE - PHYSICAL EXAMINATION
PHYSICAL EXAM:  CONSTITUTIONAL: Well appearing, awake, alert, oriented to person, place, time/situation and in no apparent distress.  HEAD: Atraumatic  EYES: Clear bilaterally, pupils equal, round and reactive to light.  ENMT: normal mucosa   CARDIAC: Normal rate, no appreciable murmur   RESPIRATORY: Breathing unlabored. Breath sounds clear and equal bilaterally.  ABDOMEN:  Soft, nontender, nondistended. No rebound tenderness or guarding.  NEUROLOGICAL: Alert and oriented, no focal deficits, no motor or sensory deficits. CN2-12 intact. Sensation intact x4 extremities.  SKIN: Skin warm and dry. No evidence of rashes or lesions.

## 2023-01-03 NOTE — ED ADULT TRIAGE NOTE - CHIEF COMPLAINT QUOTE
chest pain    c/o chest pain rad to neck, back and arm and dizziness intermittently since 2pm.  pmhx- gout

## 2023-01-03 NOTE — ED PROVIDER NOTE - CLINICAL SUMMARY MEDICAL DECISION MAKING FREE TEXT BOX
37M with 2 days of chest pain, burning in character with radiation to left arm. Will cxr, trop, cbc cmp, flu/covid to rule out acs.

## 2023-01-03 NOTE — ED PROVIDER NOTE - NS ED ROS FT
ROS:  -Constitutional: Denies fever  -Head: +headache  -Eyes: Denies blurry vision  -Cardiovascular: +chest pain  -Pulmonary: Denies shortness of breath  -Gastrointestinal: Denies nausea or diarrhea  -Genitourinary: Denies dysuria  -Skin: Denies new rashes  -Neuro: Denies numbness or tingling

## 2023-01-03 NOTE — ED ADULT NURSE NOTE - OBJECTIVE STATEMENT
Pt arrives to ED intake rm 10A A&Ox4 and ambulatory c/o L sided chest pain that radiates to the neck and down L arm, that has been constant since yesterday. Pt states yesterday that they had a rush of lightheadedness that lasted for approx 30 minutes. Pt endorsing SOB and headache at this time. Respirations ar even and unlabored. 20G placed to RAC, labs drawn and sent. Medicated as ordered

## 2023-01-03 NOTE — ED PROVIDER NOTE - NSFOLLOWUPINSTRUCTIONS_ED_ALL_ED_FT
Chest Pain    WHAT YOU NEED TO KNOW:    Chest pain can be caused by a range of conditions, from not serious to life-threatening. Chest pain can be a symptom of a digestive problem, such as acid reflux or a stomach ulcer. An anxiety attack or a strong emotion, such as anger, can also cause chest pain. Infection, inflammation, or a fracture in the bones or cartilage in your chest can cause pain or discomfort. Sometimes chest pain or pressure is caused by poor blood flow to your heart (angina). Chest pain may also be caused by life-threatening conditions such as a heart attack or blood clot in your lungs.    DISCHARGE INSTRUCTIONS:    Call your local emergency number (911 in the [place]) or have someone call if:    You have any of the following signs of a heart attack:  Squeezing, pressure, or pain in your chest    You may also have any of the following:  Discomfort or pain in your back, neck, jaw, stomach, or arm    Shortness of breath    Nausea or vomiting    Lightheadedness or a sudden cold sweat    Return to the emergency department if:    You have chest discomfort that gets worse, even with medicine.    You cough or vomit blood.    Your bowel movements are black or bloody.    You cannot stop vomiting, or it hurts to swallow.  Call your doctor if:    You have questions or concerns about your condition or care.    Medicines:    Medicines may be given to treat the cause of your chest pain. Examples include pain medicine, anxiety medicine, or medicines to increase blood flow to your heart.    Do not take certain medicines without asking your healthcare provider first. These include NSAIDs, herbal or vitamin supplements, and hormones, such as estrogen or progestin.    Take your medicine as directed. Contact your healthcare provider if you think your medicine is not helping or if you have side effects. Tell your provider if you are allergic to any medicine. Keep a list of the medicines, vitamins, and herbs you take. Include the amounts, and when and why you take them. Bring the list or the pill bottles to follow-up visits. Carry your medicine list with you in case of an emergency.  Healthy living tips: If the cause of your chest pain is known, your healthcare provider will give you specific guidelines to follow. The following are general healthy guidelines:    Do not smoke. Nicotine and other chemicals in cigarettes and cigars can cause lung and heart damage. Ask your healthcare provider for information if you currently smoke and need help to quit. E-cigarettes or smokeless tobacco still contain nicotine. Talk to your healthcare provider before you use these products.    Choose a variety of healthy foods as often as possible. Include fresh, frozen, or canned fruits and vegetables. Also include low-fat dairy products, fish, chicken (without skin), and lean meats. Your healthcare provider or a dietitian can help you create meal plans. You may need to avoid certain foods or drinks if your pain is caused by a digestion problem.  Healthy Foods      Lower your sodium (salt) intake. Limit foods that are high in sodium, such as canned foods, salty snacks, and cold cuts. If you add salt when you cook food, do not add more at the table. Choose low-sodium canned foods as much as possible.        Drink plenty of water every day. Water helps your body to control your temperature and blood pressure. Ask your healthcare provider how much water you should drink every day.    Ask about activity. Your healthcare provider will tell you which activities to limit or avoid. Ask when you can drive, return to work, and have sex. Ask about the best exercise plan for you.    Maintain a healthy weight. Ask your healthcare provider what a healthy weight is for you. Ask him or her to help you create a safe weight loss plan if you are overweight.    Ask about vaccines you may need. Your healthcare provider can tell you which vaccines you need, and when to get them. The following vaccines help prevent diseases that can become serious for a person with a heart condition:  The influenza (flu) vaccine is given each year. Get a flu vaccine as soon as recommended, usually in September or October.    The pneumonia vaccine is usually given every 5 years. Your healthcare provider may recommend the pneumonia vaccine if you are 65 or older.    COVID-19 vaccines are given to adults as a shot in 1 or 2 doses. Vaccination is recommended for all adults. A booster (additional) dose is also recommended for all adults. A second booster is recommended for all adults 50 or older and for immunocompromised adults 18 or older. The second booster is also recommended for adults who received the 1-dose vaccine for the first and booster doses. Your healthcare provider can tell you when to get one or both boosters.  Prevent Heart Disease   Follow up with your doctor within 72 hours, or as directed: You may need to return for more tests to find the cause of your chest pain. You may be referred to a specialist, such as a cardiologist or gastroenterologist. Write down your questions so you remember to ask them during your visits. You presented to the emergency department with chest pain, a chest xray and blood work showed no evidence of heart trouble. You were found however to have an elevation of your liver enzymes, which may be a result of recent alcohol consumption. Please follow up with your primary care doctor about this result.     Chest Pain    WHAT YOU NEED TO KNOW:    Chest pain can be caused by a range of conditions, from not serious to life-threatening. Chest pain can be a symptom of a digestive problem, such as acid reflux or a stomach ulcer. An anxiety attack or a strong emotion, such as anger, can also cause chest pain. Infection, inflammation, or a fracture in the bones or cartilage in your chest can cause pain or discomfort. Sometimes chest pain or pressure is caused by poor blood flow to your heart (angina). Chest pain may also be caused by life-threatening conditions such as a heart attack or blood clot in your lungs.    DISCHARGE INSTRUCTIONS:    Call your local emergency number (911 in the ) or have someone call if:    You have any of the following signs of a heart attack:  Squeezing, pressure, or pain in your chest    You may also have any of the following:  Discomfort or pain in your back, neck, jaw, stomach, or arm    Shortness of breath    Nausea or vomiting    Lightheadedness or a sudden cold sweat    Return to the emergency department if:    You have chest discomfort that gets worse, even with medicine.    You cough or vomit blood.    Your bowel movements are black or bloody.    You cannot stop vomiting, or it hurts to swallow.  Call your doctor if:    You have questions or concerns about your condition or care.    Medicines:    Medicines may be given to treat the cause of your chest pain. Examples include pain medicine, anxiety medicine, or medicines to increase blood flow to your heart.    Do not take certain medicines without asking your healthcare provider first. These include NSAIDs, herbal or vitamin supplements, and hormones, such as estrogen or progestin.    Take your medicine as directed. Contact your healthcare provider if you think your medicine is not helping or if you have side effects. Tell your provider if you are allergic to any medicine. Keep a list of the medicines, vitamins, and herbs you take. Include the amounts, and when and why you take them. Bring the list or the pill bottles to follow-up visits. Carry your medicine list with you in case of an emergency.  Healthy living tips: If the cause of your chest pain is known, your healthcare provider will give you specific guidelines to follow. The following are general healthy guidelines:    Do not smoke. Nicotine and other chemicals in cigarettes and cigars can cause lung and heart damage. Ask your healthcare provider for information if you currently smoke and need help to quit. E-cigarettes or smokeless tobacco still contain nicotine. Talk to your healthcare provider before you use these products.    Choose a variety of healthy foods as often as possible. Include fresh, frozen, or canned fruits and vegetables. Also include low-fat dairy products, fish, chicken (without skin), and lean meats. Your healthcare provider or a dietitian can help you create meal plans. You may need to avoid certain foods or drinks if your pain is caused by a digestion problem.  Healthy Foods      Lower your sodium (salt) intake. Limit foods that are high in sodium, such as canned foods, salty snacks, and cold cuts. If you add salt when you cook food, do not add more at the table. Choose low-sodium canned foods as much as possible.        Drink plenty of water every day. Water helps your body to control your temperature and blood pressure. Ask your healthcare provider how much water you should drink every day.    Ask about activity. Your healthcare provider will tell you which activities to limit or avoid. Ask when you can drive, return to work, and have sex. Ask about the best exercise plan for you.    Maintain a healthy weight. Ask your healthcare provider what a healthy weight is for you. Ask him or her to help you create a safe weight loss plan if you are overweight.    Ask about vaccines you may need. Your healthcare provider can tell you which vaccines you need, and when to get them. The following vaccines help prevent diseases that can become serious for a person with a heart condition:  The influenza (flu) vaccine is given each year. Get a flu vaccine as soon as recommended, usually in September or October.    The pneumonia vaccine is usually given every 5 years. Your healthcare provider may recommend the pneumonia vaccine if you are 65 or older.    COVID-19 vaccines are given to adults as a shot in 1 or 2 doses. Vaccination is recommended for all adults. A booster (additional) dose is also recommended for all adults. A second booster is recommended for all adults 50 or older and for immunocompromised adults 18 or older. The second booster is also recommended for adults who received the 1-dose vaccine for the first and booster doses. Your healthcare provider can tell you when to get one or both boosters.  Prevent Heart Disease   Follow up with your doctor within 72 hours, or as directed: You may need to return for more tests to find the cause of your chest pain. You may be referred to a specialist, such as a cardiologist or gastroenterologist. Write down your questions so you remember to ask them during your visits.

## 2023-01-03 NOTE — ED PROVIDER NOTE - ATTENDING CONTRIBUTION TO CARE
37M L side CP rad to arm and back since 4 am.  PMHX gout.  Hypertensive here, No fam hx CAD - fam hx of DM and HTN.  C/o associated dizziness and HA.  Not worse with deep breath.  EKG SR at 81 no nicole no std no twi   qtc 422.  (+)Smoker - advised to quit smoking.  Concern for ACS is low but present, EKG nonischemic and trop if negative sufficent to risk stratify to low sufficient for outpt eval, will expedite cards eval via discharge lounge.  CXR clear unlikely pna or ptx.   PERC negative unlikely PE  labs otherwise benign.   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  VS:  unremarkable    GEN - NAD; malaise;   A+O x3   HEAD - NC/AT     ENT - PEERL, EOMI, mucous membranes   dry, no discharge      NECK: Neck supple, non-tender without lymphadenopathy, no masses, no JVD  PULM - CTA b/l,  symmetric breath sounds  COR -  normal heart sounds    ABD - , ND, NT, soft,  BACK - no CVA tenderness, nontender spine     EXTREMS - no edema, no deformity, warm and well perfused    SKIN - no rash    or bruising      NEUROLOGIC - alert, face symmetric, speech fluent, sensation nl, motor no focal deficit.

## 2023-03-26 ENCOUNTER — EMERGENCY (EMERGENCY)
Facility: HOSPITAL | Age: 38
LOS: 1 days | Discharge: HOME | End: 2023-03-26
Attending: EMERGENCY MEDICINE | Admitting: EMERGENCY MEDICINE
Payer: MEDICAID

## 2023-03-26 VITALS
SYSTOLIC BLOOD PRESSURE: 105 MMHG | OXYGEN SATURATION: 100 % | DIASTOLIC BLOOD PRESSURE: 64 MMHG | HEART RATE: 72 BPM | RESPIRATION RATE: 16 BRPM

## 2023-03-26 VITALS
SYSTOLIC BLOOD PRESSURE: 142 MMHG | DIASTOLIC BLOOD PRESSURE: 93 MMHG | HEART RATE: 82 BPM | OXYGEN SATURATION: 96 % | TEMPERATURE: 98 F | RESPIRATION RATE: 18 BRPM

## 2023-03-26 LAB
ALBUMIN SERPL ELPH-MCNC: 4.4 G/DL — SIGNIFICANT CHANGE UP (ref 3.3–5)
ALP SERPL-CCNC: 106 U/L — SIGNIFICANT CHANGE UP (ref 40–120)
ALT FLD-CCNC: 339 U/L — HIGH (ref 4–41)
ANION GAP SERPL CALC-SCNC: 12 MMOL/L — SIGNIFICANT CHANGE UP (ref 7–14)
AST SERPL-CCNC: 188 U/L — HIGH (ref 4–40)
BASE EXCESS BLDV CALC-SCNC: 0.7 MMOL/L — SIGNIFICANT CHANGE UP (ref -2–3)
BASOPHILS # BLD AUTO: 0.05 K/UL — SIGNIFICANT CHANGE UP (ref 0–0.2)
BASOPHILS NFR BLD AUTO: 0.6 % — SIGNIFICANT CHANGE UP (ref 0–2)
BILIRUB SERPL-MCNC: 0.6 MG/DL — SIGNIFICANT CHANGE UP (ref 0.2–1.2)
BUN SERPL-MCNC: 12 MG/DL — SIGNIFICANT CHANGE UP (ref 7–23)
CA-I SERPL-SCNC: 1.22 MMOL/L — SIGNIFICANT CHANGE UP (ref 1.15–1.33)
CALCIUM SERPL-MCNC: 9.1 MG/DL — SIGNIFICANT CHANGE UP (ref 8.4–10.5)
CHLORIDE BLDV-SCNC: 103 MMOL/L — SIGNIFICANT CHANGE UP (ref 96–108)
CHLORIDE SERPL-SCNC: 103 MMOL/L — SIGNIFICANT CHANGE UP (ref 98–107)
CO2 BLDV-SCNC: 28 MMOL/L — HIGH (ref 22–26)
CO2 SERPL-SCNC: 23 MMOL/L — SIGNIFICANT CHANGE UP (ref 22–31)
CREAT SERPL-MCNC: 0.87 MG/DL — SIGNIFICANT CHANGE UP (ref 0.5–1.3)
EGFR: 113 ML/MIN/1.73M2 — SIGNIFICANT CHANGE UP
EOSINOPHIL # BLD AUTO: 0.27 K/UL — SIGNIFICANT CHANGE UP (ref 0–0.5)
EOSINOPHIL NFR BLD AUTO: 3.4 % — SIGNIFICANT CHANGE UP (ref 0–6)
GAS PNL BLDV: 133 MMOL/L — LOW (ref 136–145)
GAS PNL BLDV: SIGNIFICANT CHANGE UP
GAS PNL BLDV: SIGNIFICANT CHANGE UP
GLUCOSE BLDV-MCNC: 104 MG/DL — HIGH (ref 70–99)
GLUCOSE SERPL-MCNC: 103 MG/DL — HIGH (ref 70–99)
HCO3 BLDV-SCNC: 27 MMOL/L — SIGNIFICANT CHANGE UP (ref 22–29)
HCT VFR BLD CALC: 43.5 % — SIGNIFICANT CHANGE UP (ref 39–50)
HCT VFR BLDA CALC: 44 % — SIGNIFICANT CHANGE UP (ref 39–51)
HGB BLD CALC-MCNC: 14.8 G/DL — SIGNIFICANT CHANGE UP (ref 12.6–17.4)
HGB BLD-MCNC: 14.4 G/DL — SIGNIFICANT CHANGE UP (ref 13–17)
IANC: 4.58 K/UL — SIGNIFICANT CHANGE UP (ref 1.8–7.4)
IMM GRANULOCYTES NFR BLD AUTO: 0.4 % — SIGNIFICANT CHANGE UP (ref 0–0.9)
LACTATE BLDV-MCNC: 1 MMOL/L — SIGNIFICANT CHANGE UP (ref 0.5–2)
LIDOCAIN IGE QN: 24 U/L — SIGNIFICANT CHANGE UP (ref 7–60)
LYMPHOCYTES # BLD AUTO: 2.49 K/UL — SIGNIFICANT CHANGE UP (ref 1–3.3)
LYMPHOCYTES # BLD AUTO: 31.3 % — SIGNIFICANT CHANGE UP (ref 13–44)
MAGNESIUM SERPL-MCNC: 2.1 MG/DL — SIGNIFICANT CHANGE UP (ref 1.6–2.6)
MCHC RBC-ENTMCNC: 29.2 PG — SIGNIFICANT CHANGE UP (ref 27–34)
MCHC RBC-ENTMCNC: 33.1 GM/DL — SIGNIFICANT CHANGE UP (ref 32–36)
MCV RBC AUTO: 88.2 FL — SIGNIFICANT CHANGE UP (ref 80–100)
MONOCYTES # BLD AUTO: 0.54 K/UL — SIGNIFICANT CHANGE UP (ref 0–0.9)
MONOCYTES NFR BLD AUTO: 6.8 % — SIGNIFICANT CHANGE UP (ref 2–14)
NEUTROPHILS # BLD AUTO: 4.58 K/UL — SIGNIFICANT CHANGE UP (ref 1.8–7.4)
NEUTROPHILS NFR BLD AUTO: 57.5 % — SIGNIFICANT CHANGE UP (ref 43–77)
NRBC # BLD: 0 /100 WBCS — SIGNIFICANT CHANGE UP (ref 0–0)
NRBC # FLD: 0 K/UL — SIGNIFICANT CHANGE UP (ref 0–0)
PCO2 BLDV: 46 MMHG — SIGNIFICANT CHANGE UP (ref 42–55)
PH BLDV: 7.37 — SIGNIFICANT CHANGE UP (ref 7.32–7.43)
PLATELET # BLD AUTO: 256 K/UL — SIGNIFICANT CHANGE UP (ref 150–400)
PO2 BLDV: 45 MMHG — SIGNIFICANT CHANGE UP (ref 25–45)
POTASSIUM BLDV-SCNC: 3.8 MMOL/L — SIGNIFICANT CHANGE UP (ref 3.5–5.1)
POTASSIUM SERPL-MCNC: 3.8 MMOL/L — SIGNIFICANT CHANGE UP (ref 3.5–5.3)
POTASSIUM SERPL-SCNC: 3.8 MMOL/L — SIGNIFICANT CHANGE UP (ref 3.5–5.3)
PROT SERPL-MCNC: 7.2 G/DL — SIGNIFICANT CHANGE UP (ref 6–8.3)
RBC # BLD: 4.93 M/UL — SIGNIFICANT CHANGE UP (ref 4.2–5.8)
RBC # FLD: 12.3 % — SIGNIFICANT CHANGE UP (ref 10.3–14.5)
SAO2 % BLDV: 79.1 % — SIGNIFICANT CHANGE UP (ref 67–88)
SODIUM SERPL-SCNC: 138 MMOL/L — SIGNIFICANT CHANGE UP (ref 135–145)
TROPONIN T, HIGH SENSITIVITY RESULT: <6 NG/L — SIGNIFICANT CHANGE UP
WBC # BLD: 7.96 K/UL — SIGNIFICANT CHANGE UP (ref 3.8–10.5)
WBC # FLD AUTO: 7.96 K/UL — SIGNIFICANT CHANGE UP (ref 3.8–10.5)

## 2023-03-26 PROCEDURE — 71046 X-RAY EXAM CHEST 2 VIEWS: CPT | Mod: 26

## 2023-03-26 PROCEDURE — 99285 EMERGENCY DEPT VISIT HI MDM: CPT

## 2023-03-26 RX ORDER — ACETAMINOPHEN 500 MG
1000 TABLET ORAL ONCE
Refills: 0 | Status: COMPLETED | OUTPATIENT
Start: 2023-03-26 | End: 2023-03-26

## 2023-03-26 RX ADMIN — Medication 400 MILLIGRAM(S): at 08:48

## 2023-03-26 RX ADMIN — Medication 1000 MILLIGRAM(S): at 09:20

## 2023-03-26 NOTE — ED ADULT TRIAGE NOTE - GLASGOW COMA SCALE: SCORE, MLM
CM following for discharge planning. Pt is complaining of chest heaviness and pain per nurse and MD. PT will not be discharged today. Transport rescheduled for 1/29/23 at 3:30pm. Facility notified.      Romy Manriquez RN, BSN, [address]  Case Management Department  [phone]

## 2023-03-26 NOTE — ED ADULT TRIAGE NOTE - CHIEF COMPLAINT QUOTE
pt c/o chest pain x a few days. describes pain as constant, this AM when making breakfast became dizzy, diaphoretic and states discomfort radiating down left arm. denies PMHx.

## 2023-03-26 NOTE — ED PROVIDER NOTE - ATTENDING CONTRIBUTION TO CARE
37-year-old male past medical history mild arthritis not on medications presenting with acute onset central chest pain radiating to the left neck and left arm that began at 6:30 AM this morning. Episode lasted approximately 10 minutes, nonexertional, no associated nausea, vomiting, diaphoresis, shortness of breath.  EKG nonischemic.  Had similar visit in January for same symptoms, did not follow-up with anyone.  Given normal EKG, normal troponin and otherwise normal labs, EKG clear and chest x-ray clear will discharge with outpatient follow-up.  Patient does not have health insurance, will give referral information for medicine clinic. Pt very well appearing.

## 2023-03-26 NOTE — ED PROVIDER NOTE - OBJECTIVE STATEMENT
37-year-old male past medical history mild arthritis not on medications presenting with acute onset central chest pain radiating to the left neck and left arm that began at 6:30 AM this morning.  After 10 minutes the symptoms mostly abated, however he has residual tightness to the chest and shaking after the episode.  He denies any identifiable trigger, was having a calm relaxing morning he reports he worked overnight.  He reports has been more fatigued than normal sleeping more over the last 3 days, otherwise has not noticed any sort of exercise intolerance, or reproduction of symptoms with exertion.  He denies nausea vomiting, shortness of breath during the episode however did say that he was breathing rapidly because he was scared.  He had a similar episode a couple months ago, which was milder and did not last as long, he came to the ER everything was normal.  Denies any drug use, increase in anxiety lately, fever or chills, cough, abdominal pain, diarrhea.  Had a mild URI a few weeks ago otherwise no recent illnesses.  No sick contacts.

## 2023-03-26 NOTE — ED PROVIDER NOTE - NSFOLLOWUPINSTRUCTIONS_ED_ALL_ED_FT
Your blood work was normal, including the markers that would indicate something is wrong with your heart.  Your chest x-ray was also clear.  This is all very reassuring.  We do not have a good explanation for your symptoms, it could be a manifestation of a new panic or anxiety disorder, or caused by something that we just cannot detect on our labs.  However we are very reassured that there is nothing emergent going on such as a heart attack based on their normal work-up.      It is okay to take Tylenol or ibuprofen to help with symptoms if your pain continues, please return to the ER if your symptoms become worse including worsening chest pain, nausea vomiting to the point you can not tolerate food or water, or any other new or concerning symptoms. Please follow up with cardiology for further testing, we will have our referral coordinator reach out to you to help schedule an appointment but we have also listed both cardiology and primary care clinic below for you to follow up.    Please see a primary care doctor within 1 week. These clinics listed below take patients with no insurance.

## 2023-03-26 NOTE — ED PROVIDER NOTE - PROGRESS NOTE DETAILS
Josiah GRAY, EM/IM PGY-2: pt feels much better after Ofirm[employer], labs including troponin negative. CXR clear. OK for DC with cardiology follow up. Unlikely to be ACS given normal EKG and troponin.

## 2023-03-26 NOTE — ED PROVIDER NOTE - NSPTACCESSSVCSAPPTDETAILS_ED_ALL_ED_FT
Pt needs cardiology within 2-3 weeks, and primary care at [facility] or other no-insurance clinic to establish care within 2 weeks

## 2023-03-26 NOTE — ED PROVIDER NOTE - PATIENT PORTAL LINK FT
You can access the FollowMyHealth Patient Portal offered by [facility] by registering at the following website: [email or web address]. By joining [facility]’s FollowMyHealth portal, you will also be able to view your health information using other applications (apps) compatible with our system.

## 2023-03-26 NOTE — ED PROVIDER NOTE - PHYSICAL EXAMINATION
GENERAL: Sitting comfortably in bed in mild distress, tremulous to bilatreal hands, anxious appearing  NEURO: Alert and Oriented to person, place, date and situation. Pupils symmetric, No ptosis. No facial asymmetry or dysarthria, no tremor noted.  HEENT: No conjunctival injection or scleral icterus.   CARD: Normal rate and regular rhythm, no murmurs and no gallops appreciated.  CHEST: no tenderness or rash to the painful/tight area  RESP: Clear to auscultation bilaterally, No wheezes, rales or rhonchi. Good respiratory effort.  ABD: Nondistended, Soft and nontender to palpation in all quadrants, no guarding, no rigidity. No masses appreciated.  EXT: No pedal edema. 2+DP pulses bilaterally.  SKIN: No rashes, bruising or acute skin injuries on face, limbs, abdomen, chest or back

## 2023-03-26 NOTE — ED ADULT NURSE NOTE - OBJECTIVE STATEMENT
Pt received in room 15, c/o left sided chest pain, constant for the past few days. Denies SOB or dizziness. States he has very mild pain/tightness in the chest at this time. Denies PMH.

## 2023-03-26 NOTE — ED PROVIDER NOTE - CLINICAL SUMMARY MEDICAL DECISION MAKING FREE TEXT BOX
37-year-old male presenting with acute onset chest pain 2 hours prior to presentation.  Vital signs normal on arrival, exam with normal heart and lung exam, however patient was tremulous.  Denies any substance use, low risk for ACS given age and no comorbidities, no recent signs or symptoms of stable angina.  EKG without changes from 1/2023.  No signs of ischemia or pericarditis.  Differential including cardiac arrhythmia versus panic disorder versus less likely ACS versus GERD or less likely pancreatitis/acute intra-abdominal process.  X-ray, CBC, CMP, troponin, give IV Tylenol as patient would like something for pain/tightness at this time.

## 2023-03-26 NOTE — ED PROVIDER NOTE - NSFOLLOWUPCLINICS_GEN_ALL_ED_FT
[facility]  Cardiology  [address] [place] [address]  Phone: [phone]  Fax:   Follow Up Time: 7-10 Days    [facility] - Primary Care  Primary Care  [address] [place] [address] [place] [address]  Phone: [phone]  Fax:   Follow Up Time: 7-10 Days

## 2023-07-22 ENCOUNTER — EMERGENCY (EMERGENCY)
Facility: HOSPITAL | Age: 38
LOS: 1 days | Discharge: HOME | End: 2023-07-22
Attending: STUDENT IN AN ORGANIZED HEALTH CARE EDUCATION/TRAINING PROGRAM | Admitting: STUDENT IN AN ORGANIZED HEALTH CARE EDUCATION/TRAINING PROGRAM
Payer: MEDICAID

## 2023-07-22 VITALS
HEART RATE: 83 BPM | OXYGEN SATURATION: 98 % | SYSTOLIC BLOOD PRESSURE: 139 MMHG | RESPIRATION RATE: 16 BRPM | DIASTOLIC BLOOD PRESSURE: 82 MMHG | TEMPERATURE: 99 F

## 2023-07-22 LAB
ALBUMIN SERPL ELPH-MCNC: 4.3 G/DL — SIGNIFICANT CHANGE UP (ref 3.3–5)
ALP SERPL-CCNC: 100 U/L — SIGNIFICANT CHANGE UP (ref 40–120)
ALT FLD-CCNC: 215 U/L — HIGH (ref 4–41)
ANION GAP SERPL CALC-SCNC: 12 MMOL/L — SIGNIFICANT CHANGE UP (ref 7–14)
AST SERPL-CCNC: 122 U/L — HIGH (ref 4–40)
BASOPHILS # BLD AUTO: 0.03 K/UL — SIGNIFICANT CHANGE UP (ref 0–0.2)
BASOPHILS NFR BLD AUTO: 0.5 % — SIGNIFICANT CHANGE UP (ref 0–2)
BILIRUB SERPL-MCNC: 0.5 MG/DL — SIGNIFICANT CHANGE UP (ref 0.2–1.2)
BUN SERPL-MCNC: 9 MG/DL — SIGNIFICANT CHANGE UP (ref 7–23)
CALCIUM SERPL-MCNC: 9.1 MG/DL — SIGNIFICANT CHANGE UP (ref 8.4–10.5)
CHLORIDE SERPL-SCNC: 103 MMOL/L — SIGNIFICANT CHANGE UP (ref 98–107)
CO2 SERPL-SCNC: 23 MMOL/L — SIGNIFICANT CHANGE UP (ref 22–31)
CREAT SERPL-MCNC: 1.06 MG/DL — SIGNIFICANT CHANGE UP (ref 0.5–1.3)
EGFR: 92 ML/MIN/1.73M2 — SIGNIFICANT CHANGE UP
EOSINOPHIL # BLD AUTO: 0.1 K/UL — SIGNIFICANT CHANGE UP (ref 0–0.5)
EOSINOPHIL NFR BLD AUTO: 1.6 % — SIGNIFICANT CHANGE UP (ref 0–6)
GLUCOSE SERPL-MCNC: 101 MG/DL — HIGH (ref 70–99)
HCT VFR BLD CALC: 44.9 % — SIGNIFICANT CHANGE UP (ref 39–50)
HGB BLD-MCNC: 15.2 G/DL — SIGNIFICANT CHANGE UP (ref 13–17)
IANC: 3.49 K/UL — SIGNIFICANT CHANGE UP (ref 1.8–7.4)
IMM GRANULOCYTES NFR BLD AUTO: 0.2 % — SIGNIFICANT CHANGE UP (ref 0–0.9)
LIDOCAIN IGE QN: 25 U/L — SIGNIFICANT CHANGE UP (ref 7–60)
LYMPHOCYTES # BLD AUTO: 1.78 K/UL — SIGNIFICANT CHANGE UP (ref 1–3.3)
LYMPHOCYTES # BLD AUTO: 29 % — SIGNIFICANT CHANGE UP (ref 13–44)
MCHC RBC-ENTMCNC: 30 PG — SIGNIFICANT CHANGE UP (ref 27–34)
MCHC RBC-ENTMCNC: 33.9 GM/DL — SIGNIFICANT CHANGE UP (ref 32–36)
MCV RBC AUTO: 88.6 FL — SIGNIFICANT CHANGE UP (ref 80–100)
MONOCYTES # BLD AUTO: 0.73 K/UL — SIGNIFICANT CHANGE UP (ref 0–0.9)
MONOCYTES NFR BLD AUTO: 11.9 % — SIGNIFICANT CHANGE UP (ref 2–14)
NEUTROPHILS # BLD AUTO: 3.49 K/UL — SIGNIFICANT CHANGE UP (ref 1.8–7.4)
NEUTROPHILS NFR BLD AUTO: 56.8 % — SIGNIFICANT CHANGE UP (ref 43–77)
NRBC # BLD: 0 /100 WBCS — SIGNIFICANT CHANGE UP (ref 0–0)
NRBC # FLD: 0 K/UL — SIGNIFICANT CHANGE UP (ref 0–0)
PLATELET # BLD AUTO: 236 K/UL — SIGNIFICANT CHANGE UP (ref 150–400)
POTASSIUM SERPL-MCNC: 3.9 MMOL/L — SIGNIFICANT CHANGE UP (ref 3.5–5.3)
POTASSIUM SERPL-SCNC: 3.9 MMOL/L — SIGNIFICANT CHANGE UP (ref 3.5–5.3)
PROT SERPL-MCNC: 7.7 G/DL — SIGNIFICANT CHANGE UP (ref 6–8.3)
RBC # BLD: 5.07 M/UL — SIGNIFICANT CHANGE UP (ref 4.2–5.8)
RBC # FLD: 12.4 % — SIGNIFICANT CHANGE UP (ref 10.3–14.5)
SODIUM SERPL-SCNC: 138 MMOL/L — SIGNIFICANT CHANGE UP (ref 135–145)
WBC # BLD: 6.14 K/UL — SIGNIFICANT CHANGE UP (ref 3.8–10.5)
WBC # FLD AUTO: 6.14 K/UL — SIGNIFICANT CHANGE UP (ref 3.8–10.5)

## 2023-07-22 PROCEDURE — 74177 CT ABD & PELVIS W/CONTRAST: CPT | Mod: 26,MA

## 2023-07-22 PROCEDURE — 99285 EMERGENCY DEPT VISIT HI MDM: CPT

## 2023-07-22 RX ORDER — CYCLOBENZAPRINE HYDROCHLORIDE 10 MG/1
5 TABLET, FILM COATED ORAL ONCE
Refills: 0 | Status: DISCONTINUED | OUTPATIENT
Start: 2023-07-22 | End: 2023-07-22

## 2023-07-22 RX ORDER — ACETAMINOPHEN 500 MG
1000 TABLET ORAL ONCE
Refills: 0 | Status: COMPLETED | OUTPATIENT
Start: 2023-07-22 | End: 2023-07-22

## 2023-07-22 RX ORDER — IBUPROFEN 200 MG
400 TABLET ORAL ONCE
Refills: 0 | Status: DISCONTINUED | OUTPATIENT
Start: 2023-07-22 | End: 2023-07-22

## 2023-07-22 RX ORDER — SODIUM CHLORIDE 9 MG/ML
1000 INJECTION INTRAMUSCULAR; INTRAVENOUS; SUBCUTANEOUS ONCE
Refills: 0 | Status: COMPLETED | OUTPATIENT
Start: 2023-07-22 | End: 2023-07-22

## 2023-07-22 RX ORDER — ACETAMINOPHEN 500 MG
975 TABLET ORAL ONCE
Refills: 0 | Status: DISCONTINUED | OUTPATIENT
Start: 2023-07-22 | End: 2023-07-22

## 2023-07-22 RX ADMIN — SODIUM CHLORIDE 1000 MILLILITER(S): 9 INJECTION INTRAMUSCULAR; INTRAVENOUS; SUBCUTANEOUS at 11:08

## 2023-07-22 RX ADMIN — Medication 400 MILLIGRAM(S): at 11:09

## 2023-07-22 NOTE — ED PROVIDER NOTE - PROGRESS NOTE DETAILS
Patient reassessed and is feeling better.  Patient was p.o. challenged successfully.  Patient will be given follow-up to GI.  Patient is aware of patient given strict return precautions.

## 2023-07-22 NOTE — ED PROVIDER NOTE - NSFOLLOWUPINSTRUCTIONS_ED_ALL_ED_FT
You were seen today in the emergency room for abdominal pain. Although the testing done today indicates that your pain is not from an acute emergency, your pain could still represent a more serious problem. Most commonly, the pain you are having is likely not something serious and will resolve in a few days, however testing was done today based on the symptoms that you currently have; so if you develop new or worsening symptoms this could be a sign of a problem that was not tested for and means you should come back to the emergency room or see your doctor urgently. You need to follow up with your doctor in the next 48-72 hours. If you develop any new or worsening symptoms you need to return immediately to the emergency department. If you experience any of the following please come right back to the emergency room: severe nausea and vomiting with inability to tolerate eating, severe worsening of your pain, a new fever, new bleeding in stool or vomit, confusion, new numbness or weakness, passing out.    PLEASE FOLLOW UP WITH YOUR PCP AND WITH A GI DOCTOR.

## 2023-07-22 NOTE — ED PROVIDER NOTE - ATTENDING CONTRIBUTION TO CARE
38-year-old male with no past medical history coming in with couple of days of abdominal pain, vomiting.  States he had subjective fever yesterday.  Had 2 episodes of vomiting yesterday as well.  No blood in the vomit or stools.  No chest pain, shortness of breath, urinary complaints, testicular pain, recent travels, antibiotic use.  Patient is well-appearing.  No distress.  Abdomen soft with tenderness in left lower quadrant > mild diffuse tenderness.  No pedal edema.  Concern for diverticulitis, colitis, other intra-abdominal pathology.  Eval for electrolyte abnormality.

## 2023-07-22 NOTE — ED PROVIDER NOTE - PATIENT PORTAL LINK FT
You can access the FollowMyHealth Patient Portal offered by [facility] by registering at the following website: [email or web address]. By joining [facility] [employer]’s FollowMyHealth portal, you will also be able to view your health information using other applications (apps) compatible with our system.

## 2023-09-20 NOTE — ED ADULT NURSE NOTE - CCCP TRG CHIEF CMPLNT
Attempts have been made to contact Pravin to follow-up after automated [employer] call to discuss medication adherence concerns.  This encounter will be closed and message sent and no further outreach attempts will be made at this time.  If the patient contacts our department in the future, we will be happy to discuss medication adherence with him.    Erin De Souza [facility] Pharmacy Technician Specialist  [phone]     chest pain

## 2024-04-13 ENCOUNTER — EMERGENCY (EMERGENCY)
Facility: HOSPITAL | Age: 39
LOS: 1 days | Discharge: HOME | End: 2024-04-13
Attending: EMERGENCY MEDICINE | Admitting: EMERGENCY MEDICINE
Payer: SELF-PAY

## 2024-04-13 VITALS
HEART RATE: 94 BPM | OXYGEN SATURATION: 100 % | TEMPERATURE: 98 F | SYSTOLIC BLOOD PRESSURE: 157 MMHG | WEIGHT: 190.04 LBS | RESPIRATION RATE: 16 BRPM | DIASTOLIC BLOOD PRESSURE: 99 MMHG

## 2024-04-13 LAB
ALBUMIN SERPL ELPH-MCNC: 4.6 G/DL — SIGNIFICANT CHANGE UP (ref 3.3–5)
ALP SERPL-CCNC: 112 U/L — SIGNIFICANT CHANGE UP (ref 40–120)
ALT FLD-CCNC: 308 U/L — HIGH (ref 4–41)
ANION GAP SERPL CALC-SCNC: 14 MMOL/L — SIGNIFICANT CHANGE UP (ref 7–14)
APPEARANCE UR: CLEAR — SIGNIFICANT CHANGE UP
AST SERPL-CCNC: 157 U/L — HIGH (ref 4–40)
BASOPHILS # BLD AUTO: 0.05 K/UL — SIGNIFICANT CHANGE UP (ref 0–0.2)
BASOPHILS NFR BLD AUTO: 0.6 % — SIGNIFICANT CHANGE UP (ref 0–2)
BILIRUB SERPL-MCNC: 0.4 MG/DL — SIGNIFICANT CHANGE UP (ref 0.2–1.2)
BILIRUB UR-MCNC: NEGATIVE — SIGNIFICANT CHANGE UP
BUN SERPL-MCNC: 11 MG/DL — SIGNIFICANT CHANGE UP (ref 7–23)
CALCIUM SERPL-MCNC: 9.8 MG/DL — SIGNIFICANT CHANGE UP (ref 8.4–10.5)
CHLORIDE SERPL-SCNC: 102 MMOL/L — SIGNIFICANT CHANGE UP (ref 98–107)
CO2 SERPL-SCNC: 23 MMOL/L — SIGNIFICANT CHANGE UP (ref 22–31)
COLOR SPEC: YELLOW — SIGNIFICANT CHANGE UP
CREAT SERPL-MCNC: 0.92 MG/DL — SIGNIFICANT CHANGE UP (ref 0.5–1.3)
DIFF PNL FLD: NEGATIVE — SIGNIFICANT CHANGE UP
EGFR: 109 ML/MIN/1.73M2 — SIGNIFICANT CHANGE UP
EOSINOPHIL # BLD AUTO: 0.22 K/UL — SIGNIFICANT CHANGE UP (ref 0–0.5)
EOSINOPHIL NFR BLD AUTO: 2.5 % — SIGNIFICANT CHANGE UP (ref 0–6)
GLUCOSE SERPL-MCNC: 99 MG/DL — SIGNIFICANT CHANGE UP (ref 70–99)
GLUCOSE UR QL: NEGATIVE MG/DL — SIGNIFICANT CHANGE UP
HCT VFR BLD CALC: 46.1 % — SIGNIFICANT CHANGE UP (ref 39–50)
HGB BLD-MCNC: 15.9 G/DL — SIGNIFICANT CHANGE UP (ref 13–17)
HIV 1+2 AB+HIV1 P24 AG SERPL QL IA: SIGNIFICANT CHANGE UP
IANC: 4.97 K/UL — SIGNIFICANT CHANGE UP (ref 1.8–7.4)
IMM GRANULOCYTES NFR BLD AUTO: 0.8 % — SIGNIFICANT CHANGE UP (ref 0–0.9)
KETONES UR-MCNC: NEGATIVE MG/DL — SIGNIFICANT CHANGE UP
LEUKOCYTE ESTERASE UR-ACNC: NEGATIVE — SIGNIFICANT CHANGE UP
LIDOCAIN IGE QN: 24 U/L — SIGNIFICANT CHANGE UP (ref 7–60)
LYMPHOCYTES # BLD AUTO: 2.8 K/UL — SIGNIFICANT CHANGE UP (ref 1–3.3)
LYMPHOCYTES # BLD AUTO: 31.8 % — SIGNIFICANT CHANGE UP (ref 13–44)
MCHC RBC-ENTMCNC: 30 PG — SIGNIFICANT CHANGE UP (ref 27–34)
MCHC RBC-ENTMCNC: 34.5 GM/DL — SIGNIFICANT CHANGE UP (ref 32–36)
MCV RBC AUTO: 87 FL — SIGNIFICANT CHANGE UP (ref 80–100)
MONOCYTES # BLD AUTO: 0.7 K/UL — SIGNIFICANT CHANGE UP (ref 0–0.9)
MONOCYTES NFR BLD AUTO: 7.9 % — SIGNIFICANT CHANGE UP (ref 2–14)
NEUTROPHILS # BLD AUTO: 4.97 K/UL — SIGNIFICANT CHANGE UP (ref 1.8–7.4)
NEUTROPHILS NFR BLD AUTO: 56.4 % — SIGNIFICANT CHANGE UP (ref 43–77)
NITRITE UR-MCNC: NEGATIVE — SIGNIFICANT CHANGE UP
NRBC # BLD: 0 /100 WBCS — SIGNIFICANT CHANGE UP (ref 0–0)
NRBC # FLD: 0 K/UL — SIGNIFICANT CHANGE UP (ref 0–0)
PH UR: 6.5 — SIGNIFICANT CHANGE UP (ref 5–8)
PLATELET # BLD AUTO: 277 K/UL — SIGNIFICANT CHANGE UP (ref 150–400)
POTASSIUM SERPL-MCNC: 4.2 MMOL/L — SIGNIFICANT CHANGE UP (ref 3.5–5.3)
POTASSIUM SERPL-SCNC: 4.2 MMOL/L — SIGNIFICANT CHANGE UP (ref 3.5–5.3)
PROT SERPL-MCNC: 8.2 G/DL — SIGNIFICANT CHANGE UP (ref 6–8.3)
PROT UR-MCNC: NEGATIVE MG/DL — SIGNIFICANT CHANGE UP
RBC # BLD: 5.3 M/UL — SIGNIFICANT CHANGE UP (ref 4.2–5.8)
RBC # FLD: 12.6 % — SIGNIFICANT CHANGE UP (ref 10.3–14.5)
SODIUM SERPL-SCNC: 139 MMOL/L — SIGNIFICANT CHANGE UP (ref 135–145)
SP GR SPEC: 1.02 — SIGNIFICANT CHANGE UP (ref 1–1.03)
UROBILINOGEN FLD QL: 0.2 MG/DL — SIGNIFICANT CHANGE UP (ref 0.2–1)
WBC # BLD: 8.81 K/UL — SIGNIFICANT CHANGE UP (ref 3.8–10.5)
WBC # FLD AUTO: 8.81 K/UL — SIGNIFICANT CHANGE UP (ref 3.8–10.5)

## 2024-04-13 PROCEDURE — 74177 CT ABD & PELVIS W/CONTRAST: CPT | Mod: 26,MC

## 2024-04-13 PROCEDURE — 99285 EMERGENCY DEPT VISIT HI MDM: CPT

## 2024-04-13 RX ORDER — KETOROLAC TROMETHAMINE 30 MG/ML
15 SYRINGE (ML) INJECTION ONCE
Refills: 0 | Status: DISCONTINUED | OUTPATIENT
Start: 2024-04-13 | End: 2024-04-13

## 2024-04-13 RX ORDER — SODIUM CHLORIDE 9 MG/ML
1000 INJECTION INTRAMUSCULAR; INTRAVENOUS; SUBCUTANEOUS ONCE
Refills: 0 | Status: COMPLETED | OUTPATIENT
Start: 2024-04-13 | End: 2024-04-13

## 2024-04-13 RX ADMIN — Medication 15 MILLIGRAM(S): at 10:26

## 2024-04-13 RX ADMIN — SODIUM CHLORIDE 1000 MILLILITER(S): 9 INJECTION INTRAMUSCULAR; INTRAVENOUS; SUBCUTANEOUS at 10:29

## 2024-04-13 NOTE — ED ADULT TRIAGE NOTE - CHIEF COMPLAINT QUOTE
Pt c/o right lower abdomen pain that radiates to his back and down his right leg.  Pt states it started 1 week ago.  Nausea at night time, no pain during urination.  Pt also c/o rash on testicle.  Hx:gout

## 2024-04-13 NOTE — ED ADULT NURSE NOTE - OBJECTIVE STATEMENT
Received pt in bed A and OX 3 in NAD resting comfortably, c/o lower abd pain and skin discoloration to scrotum onset 1 week ago, denies pain or exudate at the site, no rash noted to the  affected extremity. orders noted and compelted

## 2024-04-13 NOTE — ED PROVIDER NOTE - NSFOLLOWUPINSTRUCTIONS_ED_ALL_ED_FT
PLEASE FOLLOW UP WITH YOUR PRIMARY CARE PROVIDER AND GASTROENTEROLOGY, RESOURCES PROVIDED TO YOU.     Abdominal Pain    Many things can cause abdominal pain. Many times, abdominal pain is not caused by a disease and will improve without treatment. Your health care provider will do a physical exam to determine if there is a dangerous cause of your pain; blood tests and imaging may help determine the cause of your pain. However, in many cases, no cause may be found and you may need further testing as an outpatient. Monitor your abdominal pain for any changes.     SEEK IMMEDIATE MEDICAL CARE IF YOU HAVE ANY OF THE FOLLOWING SYMPTOMS: worsening abdominal pain, uncontrollable vomiting, profuse diarrhea, inability to have bowel movements or pass gas, black or bloody stools, fever accompanying chest pain or back pain, or fainting. These symptoms may represent a serious problem that is an emergency. Do not wait to see if the symptoms will go away. Get medical help right away. Call 911 and do not drive yourself to the hospital.

## 2024-04-13 NOTE — ED PROVIDER NOTE - NSPTACCESSSVCSAPPT_ED_ALL_ED
Specialty Care and/or PCP (routine)... Specialty Care and/or PCP (routine).../Connect patient to Primary Care...

## 2024-04-13 NOTE — ED PROVIDER NOTE - PROGRESS NOTE DETAILS
NOA Rolle- discussed ct results in great details resulted in fatty liver, small umbilical hernia contains fat, no appendicitis no other acute findings. Liver function is elevated but reports he used to drink a lot he cut back, nowadays he drinks once a week. Denies any otc gym supplements. Able to tolerate po intake. Discussed strict return precautions, recommendation follow up with PMD and GI, verbalized understands recommendations.

## 2024-04-13 NOTE — ED PROVIDER NOTE - OBJECTIVE STATEMENT
This is a 28-year-old male no pertinent past medical history with complaint of right lower quadrant abdominal pain for 1 week, associated with pain 8 out of 10 with intermittent dizziness and constipation. Complaining of improving scrotal rash ( shaving) for the last 2 months and it is improving. Denies fever, shortness of breath, chest pain, denies urinary symptoms, blood in the urine or stool, unintentional weight loss, history of STIs , nausea, vomiting.

## 2024-04-13 NOTE — ED PROVIDER NOTE - CLINICAL SUMMARY MEDICAL DECISION MAKING FREE TEXT BOX
This is a 28-year-old male no pertinent past medical history with complaint of right lower quadrant abdominal pain for 1 week, associated with pain 8 out of 10 with intermittent dizziness and constipation. Complaining of improving scrotal rash ( shaving) for the last 2 months and it is improving. Denies fever, shortness of breath, chest pain, denies urinary symptoms, blood in the urine or stool, unintentional weight loss, history of STIs , nausea, vomiting. Pt reports he is lifting heavy at work and started to lift heavy in the gym as well.   labs, ua, sti testing, ct abd pain r/o intraabd pathology not limited to appendix

## 2024-04-13 NOTE — ED PROVIDER NOTE - ATTENDING APP SHARED VISIT CONTRIBUTION OF CARE
Seen and examined, d/w PA. Pt. with gradual onset of coming and going RLQ pain, initially mild, now constant and worsened, occ. rad. to RLE. No change with movement or leg range of motion but does lift heavy objects and inc. working out. Denies N/V/D, no fever/chills, no PSH. Pt. with hx of EtOH use, last drink 4-5d ago, no hx of withdrawal or hospitalization. Denies OTC meds or herbs, no recreational drugs. MMM, distended abd, tender RLQ and R groin without obv hernia, neg. psoas signs, no CVAT. CT performed showing no appendicitis, and hepatic steatosis. No RUQ tenderness, neg Yousif's, no food related pains. Poss. hernia sx with no obv. hernia found on exam and potentially unrelated elev. LFT and hepatic steatosis. No meds PTA, pt. to use OTC meds, fu for rpt blood work, poss. surgical eval for hernia.

## 2024-04-13 NOTE — ED PROVIDER NOTE - PATIENT PORTAL LINK FT
You can access the FollowMyHealth Patient Portal offered by [facility] by registering at the following website: [email or web address]. By joining [facility]’s FollowMyHealth portal, you will also be able to view your health information using other applications (apps) compatible with our system.

## 2024-04-15 LAB
C TRACH RRNA SPEC QL NAA+PROBE: SIGNIFICANT CHANGE UP
N GONORRHOEA RRNA SPEC QL NAA+PROBE: SIGNIFICANT CHANGE UP
SPECIMEN SOURCE: SIGNIFICANT CHANGE UP
T PALLIDUM AB TITR SER: NEGATIVE — SIGNIFICANT CHANGE UP

## 2024-07-24 NOTE — ED ADULT TRIAGE NOTE - CCCP TRG CHIEF CMPLNT
Mr. Kenyon Yeboah presented today in the sleep center for a Home Sleep Test (HST).  Mr. Yeboah was instructed on the device and was requested to wear the unit for two nights.  was asked to have the HST monitor back by 10AM 07/26/2024. The patient acknowledged he understood. The HST device was tested and was in working order.     chest pain

## 2024-11-12 NOTE — ED ADULT NURSE NOTE - SUICIDE SCREENING QUESTION 1
Detail Level: Simple
Moisturizer Recommendations: Discussed importance of moisturizing with thick cream to help prevent flares.
Patient unable to complete